# Patient Record
Sex: MALE | Race: WHITE | NOT HISPANIC OR LATINO | Employment: OTHER | URBAN - METROPOLITAN AREA
[De-identification: names, ages, dates, MRNs, and addresses within clinical notes are randomized per-mention and may not be internally consistent; named-entity substitution may affect disease eponyms.]

---

## 2023-05-06 RX ORDER — NAPROXEN 500 MG/1
TABLET ORAL
Qty: 180 TABLET | Refills: 0 | OUTPATIENT
Start: 2023-05-06

## 2023-05-06 RX ORDER — LORATADINE 10 MG/1
TABLET ORAL
Qty: 30 TABLET | Refills: 0 | OUTPATIENT
Start: 2023-05-06

## 2023-05-19 ENCOUNTER — OFFICE VISIT (OUTPATIENT)
Dept: PRIMARY CARE | Facility: CLINIC | Age: 38
End: 2023-05-19
Payer: COMMERCIAL

## 2023-05-19 ENCOUNTER — LAB (OUTPATIENT)
Dept: LAB | Facility: LAB | Age: 38
End: 2023-05-19
Payer: COMMERCIAL

## 2023-05-19 VITALS
HEIGHT: 70 IN | BODY MASS INDEX: 40.8 KG/M2 | DIASTOLIC BLOOD PRESSURE: 76 MMHG | WEIGHT: 285 LBS | SYSTOLIC BLOOD PRESSURE: 126 MMHG

## 2023-05-19 DIAGNOSIS — Z13.220 LIPID SCREENING: ICD-10-CM

## 2023-05-19 DIAGNOSIS — T78.40XA ALLERGY, INITIAL ENCOUNTER: ICD-10-CM

## 2023-05-19 DIAGNOSIS — I10 PRIMARY HYPERTENSION: ICD-10-CM

## 2023-05-19 DIAGNOSIS — Z00.00 HEALTH MAINTENANCE EXAMINATION: ICD-10-CM

## 2023-05-19 DIAGNOSIS — K21.9 GASTROESOPHAGEAL REFLUX DISEASE WITHOUT ESOPHAGITIS: ICD-10-CM

## 2023-05-19 PROBLEM — J45.909 ASTHMA (HHS-HCC): Status: ACTIVE | Noted: 2023-05-19

## 2023-05-19 PROBLEM — H69.91 DYSFUNCTION OF RIGHT EUSTACHIAN TUBE: Status: ACTIVE | Noted: 2023-01-19

## 2023-05-19 PROBLEM — I42.9 CARDIOMYOPATHY (MULTI): Status: ACTIVE | Noted: 2023-05-19

## 2023-05-19 PROBLEM — M54.9 BACK PAIN: Status: ACTIVE | Noted: 2023-05-19

## 2023-05-19 PROBLEM — M26.629 TEMPOROMANDIBULAR JOINT-PAIN-DYSFUNCTION SYNDROME: Status: ACTIVE | Noted: 2020-10-06

## 2023-05-19 PROBLEM — J32.2 CHRONIC ETHMOIDAL SINUSITIS: Status: ACTIVE | Noted: 2020-08-15

## 2023-05-19 PROBLEM — H93.13 BILATERAL TINNITUS: Status: ACTIVE | Noted: 2021-02-09

## 2023-05-19 PROBLEM — H92.01 REFERRED OTALGIA OF RIGHT EAR: Status: ACTIVE | Noted: 2020-10-06

## 2023-05-19 PROBLEM — J32.1 CHRONIC FRONTAL SINUSITIS: Status: ACTIVE | Noted: 2020-08-15

## 2023-05-19 PROBLEM — H66.90 ACUTE OTITIS MEDIA: Status: ACTIVE | Noted: 2023-05-19

## 2023-05-19 PROBLEM — K92.1 BLOODY STOOL: Status: ACTIVE | Noted: 2023-05-19

## 2023-05-19 PROBLEM — R63.5 WEIGHT GAIN: Status: ACTIVE | Noted: 2023-05-19

## 2023-05-19 PROBLEM — J30.2 SEASONAL ALLERGIC RHINITIS: Status: ACTIVE | Noted: 2020-08-15

## 2023-05-19 PROBLEM — J01.90 ACUTE SINUSITIS: Status: ACTIVE | Noted: 2023-05-19

## 2023-05-19 PROBLEM — H93.12 TINNITUS OF LEFT EAR: Status: ACTIVE | Noted: 2021-08-31

## 2023-05-19 LAB
ALANINE AMINOTRANSFERASE (SGPT) (U/L) IN SER/PLAS: 31 U/L (ref 10–52)
ALBUMIN (G/DL) IN SER/PLAS: 4.6 G/DL (ref 3.4–5)
ALKALINE PHOSPHATASE (U/L) IN SER/PLAS: 67 U/L (ref 33–120)
ANION GAP IN SER/PLAS: 14 MMOL/L (ref 10–20)
APPEARANCE, URINE: CLEAR
ASPARTATE AMINOTRANSFERASE (SGOT) (U/L) IN SER/PLAS: 30 U/L (ref 9–39)
BILIRUBIN TOTAL (MG/DL) IN SER/PLAS: 0.5 MG/DL (ref 0–1.2)
BILIRUBIN, URINE: NEGATIVE
BLOOD, URINE: NEGATIVE
CALCIUM (MG/DL) IN SER/PLAS: 9.6 MG/DL (ref 8.6–10.6)
CARBON DIOXIDE, TOTAL (MMOL/L) IN SER/PLAS: 28 MMOL/L (ref 21–32)
CHLORIDE (MMOL/L) IN SER/PLAS: 104 MMOL/L (ref 98–107)
CHOLESTEROL (MG/DL) IN SER/PLAS: 163 MG/DL (ref 0–199)
CHOLESTEROL IN HDL (MG/DL) IN SER/PLAS: 32.5 MG/DL
CHOLESTEROL/HDL RATIO: 5
COLOR, URINE: YELLOW
CREATININE (MG/DL) IN SER/PLAS: 0.89 MG/DL (ref 0.5–1.3)
ERYTHROCYTE DISTRIBUTION WIDTH (RATIO) BY AUTOMATED COUNT: 12.9 % (ref 11.5–14.5)
ERYTHROCYTE MEAN CORPUSCULAR HEMOGLOBIN CONCENTRATION (G/DL) BY AUTOMATED: 32.5 G/DL (ref 32–36)
ERYTHROCYTE MEAN CORPUSCULAR VOLUME (FL) BY AUTOMATED COUNT: 89 FL (ref 80–100)
ERYTHROCYTES (10*6/UL) IN BLOOD BY AUTOMATED COUNT: 5.1 X10E12/L (ref 4.5–5.9)
GFR MALE: >90 ML/MIN/1.73M2
GLUCOSE (MG/DL) IN SER/PLAS: 74 MG/DL (ref 74–99)
GLUCOSE, URINE: NEGATIVE MG/DL
HEMATOCRIT (%) IN BLOOD BY AUTOMATED COUNT: 45.3 % (ref 41–52)
HEMOGLOBIN (G/DL) IN BLOOD: 14.7 G/DL (ref 13.5–17.5)
KETONES, URINE: NEGATIVE MG/DL
LEUKOCYTE ESTERASE, URINE: NEGATIVE
LEUKOCYTES (10*3/UL) IN BLOOD BY AUTOMATED COUNT: 7.4 X10E9/L (ref 4.4–11.3)
NITRITE, URINE: NEGATIVE
NON-HDL CHOLESTEROL: 131 MG/DL
NRBC (PER 100 WBCS) BY AUTOMATED COUNT: 0 /100 WBC (ref 0–0)
PH, URINE: 6 (ref 5–8)
PLATELETS (10*3/UL) IN BLOOD AUTOMATED COUNT: 251 X10E9/L (ref 150–450)
POTASSIUM (MMOL/L) IN SER/PLAS: 4.2 MMOL/L (ref 3.5–5.3)
PROTEIN TOTAL: 7.7 G/DL (ref 6.4–8.2)
PROTEIN, URINE: NEGATIVE MG/DL
SODIUM (MMOL/L) IN SER/PLAS: 142 MMOL/L (ref 136–145)
SPECIFIC GRAVITY, URINE: 1.01 (ref 1–1.03)
UREA NITROGEN (MG/DL) IN SER/PLAS: 14 MG/DL (ref 6–23)
UROBILINOGEN, URINE: <2 MG/DL (ref 0–1.9)

## 2023-05-19 PROCEDURE — 84443 ASSAY THYROID STIM HORMONE: CPT

## 2023-05-19 PROCEDURE — 83718 ASSAY OF LIPOPROTEIN: CPT

## 2023-05-19 PROCEDURE — 81003 URINALYSIS AUTO W/O SCOPE: CPT

## 2023-05-19 PROCEDURE — 3074F SYST BP LT 130 MM HG: CPT | Performed by: INTERNAL MEDICINE

## 2023-05-19 PROCEDURE — 85027 COMPLETE CBC AUTOMATED: CPT

## 2023-05-19 PROCEDURE — 99213 OFFICE O/P EST LOW 20 MIN: CPT | Performed by: INTERNAL MEDICINE

## 2023-05-19 PROCEDURE — 80053 COMPREHEN METABOLIC PANEL: CPT

## 2023-05-19 PROCEDURE — 82465 ASSAY BLD/SERUM CHOLESTEROL: CPT

## 2023-05-19 PROCEDURE — 36415 COLL VENOUS BLD VENIPUNCTURE: CPT

## 2023-05-19 PROCEDURE — 3078F DIAST BP <80 MM HG: CPT | Performed by: INTERNAL MEDICINE

## 2023-05-19 RX ORDER — OMEPRAZOLE 20 MG/1
CAPSULE, DELAYED RELEASE ORAL
Qty: 90 CAPSULE | Refills: 0 | Status: SHIPPED | OUTPATIENT
Start: 2023-05-19 | End: 2023-09-07

## 2023-05-19 RX ORDER — FLUTICASONE PROPIONATE 93 UG/1
SPRAY, METERED NASAL EVERY 12 HOURS
COMMUNITY

## 2023-05-19 RX ORDER — NAPROXEN 500 MG/1
TABLET ORAL
COMMUNITY
Start: 2016-03-30 | End: 2023-05-19 | Stop reason: SDUPTHER

## 2023-05-19 RX ORDER — FLUTICASONE PROPIONATE 50 MCG
SPRAY, SUSPENSION (ML) NASAL
COMMUNITY
Start: 2017-02-14 | End: 2023-05-19

## 2023-05-19 RX ORDER — LISINOPRIL 5 MG/1
5 TABLET ORAL DAILY
COMMUNITY
Start: 2013-10-25 | End: 2023-05-19 | Stop reason: SDUPTHER

## 2023-05-19 RX ORDER — NAPROXEN 500 MG/1
TABLET ORAL
Qty: 90 TABLET | Refills: 0 | Status: SHIPPED | OUTPATIENT
Start: 2023-05-19 | End: 2023-12-11

## 2023-05-19 RX ORDER — LORATADINE 10 MG/1
10 TABLET ORAL DAILY
COMMUNITY
Start: 2020-07-21 | End: 2023-05-19 | Stop reason: SDUPTHER

## 2023-05-19 RX ORDER — LORATADINE 10 MG/1
10 TABLET ORAL DAILY
Qty: 90 TABLET | Refills: 0 | Status: SHIPPED | OUTPATIENT
Start: 2023-05-19 | End: 2023-10-20 | Stop reason: ALTCHOICE

## 2023-05-19 RX ORDER — LISINOPRIL 5 MG/1
5 TABLET ORAL DAILY
Qty: 90 TABLET | Refills: 0 | Status: SHIPPED | OUTPATIENT
Start: 2023-05-19 | End: 2023-09-07

## 2023-05-19 RX ORDER — OMEPRAZOLE 20 MG/1
CAPSULE, DELAYED RELEASE ORAL
COMMUNITY
End: 2023-05-19 | Stop reason: SDUPTHER

## 2023-05-19 NOTE — PROGRESS NOTES
"OFFICE NOTE    NAME OF THE PATIENT: Dex Charles    YOB: 1985    CHIEF COMPLAINT:  Dex Charles today came here for follow-up on various conditions.  Overall, he is a happy person.  Appetite and weight are okay.  He wants a refill on medication.  Arthritis okay.  No problem.    PAST MEDICAL HISTORY:  Reviewed on EMR, unchanged.    CURRENT MEDICATIONS:  Reviewed on EMR, unchanged.    ALLERGIES:  Reviewed on EMR, unchanged.    SOCIAL HISTORY:  Reviewed on EMR, unchanged.    FAMILY HISTORY:  Reviewed on EMR, unchanged.    REVIEW OF SYSTEMS:  All 12 systems reviewed and pertaining covered in history and physical.    PHYSICAL EXAMINATION  VITAL SIGNS:  As recorded and reviewed from EMR.  RESPIRATORY:  The patient had normal inspirations and expirations.  The breath sounds were equal bilaterally and clear to auscultation.  CARDIOVASCULAR:  The patient had S1 normal, split S2 without obvious rubs, clicks, or murmurs.    GASTROINTESTINAL:  There was no hepatosplenomegaly.  There were no palpable masses and no inguinal nodes.  EXTREMITIES:  Legs had no edema.  NEUROLOGIC:  The patient had normal cranial nerves.  The reflexes, sensory, and motor examination were grossly within normal limits.    LAB WORK:  Laboratory testing discussed.    ASSESSMENT AND PLAN:  Hypertension.  Kidney okay.  Cholesterol.  Monitor.  Overweight.  Diet, exercise.  Complete blood work ordered.  I shall see him in a week after tests.  I urged him to come for physical.    Kindly review this note in conjunction with EMR.   Subjective   Patient ID: Dex Charles is a 38 y.o. male who presents for Follow-up.      HPI    Review of Systems    Objective   /76   Ht 1.778 m (5' 10\")   Wt 129 kg (285 lb)   BMI 40.89 kg/m²       Physical Exam    Assessment/Plan   Problem List Items Addressed This Visit          Circulatory    Hypertension    Relevant Medications    lisinopril 5 mg tablet    Other Relevant Orders    CBC " (Completed)    Urinalysis with Reflex Microscopic       Digestive    GERD (gastroesophageal reflux disease)    Relevant Medications    omeprazole (PriLOSEC) 20 mg DR capsule       Other    Allergy    Relevant Medications    naproxen (Naprosyn) 500 mg tablet    loratadine (Claritin) 10 mg tablet     Other Visit Diagnoses       Health maintenance examination        Relevant Orders    CBC (Completed)    Comprehensive metabolic panel    TSH    Urinalysis with Reflex Microscopic    Lipid screening        Relevant Orders    Lipid Panel Non-Fasting

## 2023-05-20 LAB — THYROTROPIN (MIU/L) IN SER/PLAS BY DETECTION LIMIT <= 0.05 MIU/L: 1.03 MIU/L (ref 0.44–3.98)

## 2023-06-27 ENCOUNTER — TELEMEDICINE (OUTPATIENT)
Dept: PRIMARY CARE | Facility: CLINIC | Age: 38
End: 2023-06-27
Payer: COMMERCIAL

## 2023-06-27 VITALS — BODY MASS INDEX: 40.8 KG/M2 | HEIGHT: 70 IN | WEIGHT: 285 LBS

## 2023-06-27 DIAGNOSIS — R09.81 SINUS CONGESTION: ICD-10-CM

## 2023-06-27 PROCEDURE — 99213 OFFICE O/P EST LOW 20 MIN: CPT | Performed by: INTERNAL MEDICINE

## 2023-06-27 RX ORDER — DOXYCYCLINE 100 MG/1
100 CAPSULE ORAL 2 TIMES DAILY
Qty: 20 CAPSULE | Refills: 0 | Status: SHIPPED | OUTPATIENT
Start: 2023-06-27 | End: 2023-07-07

## 2023-06-27 NOTE — PROGRESS NOTES
"Subjective   Patient ID: Dex Charles is a 38 y.o. male who presents for virtual visit (Sinus congestion).    HPI   Patient presents with complaints of having sinus congestion postnasal drainage he does have allergies  Ears feeling plugged up having chest congestion no  He is supposed to start some treatment but they want to treat infection to be treated.  Patient is non-smoker  Review of Systems   All other systems reviewed and are negative.      Objective   Ht 1.778 m (5' 10\")   Wt 129 kg (285 lb)   BMI 40.89 kg/m²     Physical Exam  On virtual visit patient does sound very congested and cough is more sounding congested in the upper airways  Assessment/Plan   Problem List Items Addressed This Visit    None  Visit Diagnoses       Sinus congestion        Relevant Medications    doxycycline (Vibramycin) 100 mg capsule          Treat with doxycycline for 10 days  Steam saline gargles rest fluids  Follow-up if not better     "

## 2023-09-06 DIAGNOSIS — I10 PRIMARY HYPERTENSION: ICD-10-CM

## 2023-09-06 DIAGNOSIS — K21.9 GASTROESOPHAGEAL REFLUX DISEASE WITHOUT ESOPHAGITIS: ICD-10-CM

## 2023-09-07 RX ORDER — LISINOPRIL 5 MG/1
5 TABLET ORAL DAILY
Qty: 90 TABLET | Refills: 0 | Status: SHIPPED | OUTPATIENT
Start: 2023-09-07 | End: 2023-12-09

## 2023-09-07 RX ORDER — OMEPRAZOLE 20 MG/1
CAPSULE, DELAYED RELEASE ORAL
Qty: 90 CAPSULE | Refills: 0 | Status: SHIPPED | OUTPATIENT
Start: 2023-09-07 | End: 2023-12-09

## 2023-09-19 ENCOUNTER — TELEMEDICINE (OUTPATIENT)
Dept: PRIMARY CARE | Facility: CLINIC | Age: 38
End: 2023-09-19
Payer: COMMERCIAL

## 2023-09-19 DIAGNOSIS — J02.9 ACUTE PHARYNGITIS, UNSPECIFIED ETIOLOGY: ICD-10-CM

## 2023-09-19 DIAGNOSIS — I10 HYPERTENSION, UNSPECIFIED TYPE: ICD-10-CM

## 2023-09-19 DIAGNOSIS — J01.90 ACUTE SINUSITIS, RECURRENCE NOT SPECIFIED, UNSPECIFIED LOCATION: Primary | ICD-10-CM

## 2023-09-19 DIAGNOSIS — J20.8 ACUTE BRONCHITIS DUE TO OTHER SPECIFIED ORGANISMS: ICD-10-CM

## 2023-09-19 PROCEDURE — 99213 OFFICE O/P EST LOW 20 MIN: CPT | Performed by: INTERNAL MEDICINE

## 2023-09-19 RX ORDER — DOXYCYCLINE 100 MG/1
100 CAPSULE ORAL 2 TIMES DAILY
Qty: 20 CAPSULE | Refills: 0 | Status: SHIPPED | OUTPATIENT
Start: 2023-09-19 | End: 2023-09-29

## 2023-09-19 RX ORDER — AZELASTINE 1 MG/ML
SPRAY, METERED NASAL
COMMUNITY

## 2023-09-19 RX ORDER — LORATADINE 10 MG/1
10 TABLET ORAL DAILY
Qty: 30 TABLET | Refills: 2 | Status: SHIPPED | OUTPATIENT
Start: 2023-09-19 | End: 2023-10-20 | Stop reason: ALTCHOICE

## 2023-09-19 ASSESSMENT — ENCOUNTER SYMPTOMS
DEPRESSION: 0
OCCASIONAL FEELINGS OF UNSTEADINESS: 0
LOSS OF SENSATION IN FEET: 0

## 2023-09-20 PROBLEM — J20.8 ACUTE BRONCHITIS DUE TO OTHER SPECIFIED ORGANISMS: Status: ACTIVE | Noted: 2023-09-20

## 2023-09-20 PROBLEM — J02.9 ACUTE PHARYNGITIS: Status: ACTIVE | Noted: 2023-09-20

## 2023-09-20 NOTE — PROGRESS NOTES
Subjective   Patient ID: Dex Charles is a 38 y.o. male who presents for Follow-up.    This gentleman today came here for virtual visit.  Cough, yellow sputum, sinus congestion, bronchitis, headache for last several days.  Over-the-counter medications not helping.  He came for follow-up.    I have personally reviewed the patient's Past Medical History, Medications, Allergies, Social History, and Family History in the EMR.    Review of Systems   All other systems reviewed and are negative.    Objective   Virtual.  There were no vitals taken for this visit.    Physical Exam  HENT:      Nose: Congestion present.      Comments: Postnasal drip.     Mouth/Throat:      Comments: Throat congested.    LAB WORK: Laboratory testing discussed.    Assessment/Plan   Problem List Items Addressed This Visit          Cardiac and Vasculature    Hypertension       ENT    Acute sinusitis - Primary    Relevant Medications    doxycycline (Vibramycin) 100 mg capsule    loratadine (Claritin) 10 mg tablet   1. Rhinosinusitis, pharyngitis, bronchitis.  Saline gargles, steam inhalation.  Doxycycline, Claritin, Robitussin, Flonase.  Follow-up in two weeks if not better.  2. Hypertension, okay.    Scribe Attestation  By signing my name below, I, Jordi Chinchilla   attest that this documentation has been prepared under the direction and in the presence of Areli Calvo MD.

## 2023-10-20 ENCOUNTER — OFFICE VISIT (OUTPATIENT)
Dept: PRIMARY CARE | Facility: CLINIC | Age: 38
End: 2023-10-20
Payer: COMMERCIAL

## 2023-10-20 VITALS
DIASTOLIC BLOOD PRESSURE: 60 MMHG | HEIGHT: 70 IN | SYSTOLIC BLOOD PRESSURE: 122 MMHG | BODY MASS INDEX: 40.94 KG/M2 | WEIGHT: 286 LBS

## 2023-10-20 DIAGNOSIS — J01.90 ACUTE SINUSITIS, RECURRENCE NOT SPECIFIED, UNSPECIFIED LOCATION: ICD-10-CM

## 2023-10-20 DIAGNOSIS — I10 PRIMARY HYPERTENSION: Primary | ICD-10-CM

## 2023-10-20 PROCEDURE — 99214 OFFICE O/P EST MOD 30 MIN: CPT | Performed by: INTERNAL MEDICINE

## 2023-10-20 PROCEDURE — 1036F TOBACCO NON-USER: CPT | Performed by: INTERNAL MEDICINE

## 2023-10-20 PROCEDURE — 3074F SYST BP LT 130 MM HG: CPT | Performed by: INTERNAL MEDICINE

## 2023-10-20 PROCEDURE — 3078F DIAST BP <80 MM HG: CPT | Performed by: INTERNAL MEDICINE

## 2023-10-20 RX ORDER — DEXTROMETHORPHAN HYDROBROMIDE, GUAIFENESIN 20; 400 MG/20ML; MG/20ML
5 SOLUTION ORAL 2 TIMES DAILY
Qty: 118 ML | Refills: 0 | Status: SHIPPED | OUTPATIENT
Start: 2023-10-20

## 2023-10-20 RX ORDER — AZITHROMYCIN 250 MG/1
TABLET, FILM COATED ORAL
Qty: 6 TABLET | Refills: 0 | Status: SHIPPED | OUTPATIENT
Start: 2023-10-20 | End: 2023-10-25

## 2023-10-20 RX ORDER — LORATADINE 10 MG/1
10 TABLET ORAL DAILY
Qty: 30 TABLET | Refills: 2 | Status: SHIPPED | OUTPATIENT
Start: 2023-10-20 | End: 2024-02-26 | Stop reason: SDUPTHER

## 2023-10-21 ASSESSMENT — ENCOUNTER SYMPTOMS: SINUS COMPLAINT: 1

## 2023-10-21 NOTE — PROGRESS NOTES
"Subjective   Patient ID: Dex Charles is a 38 y.o. male who presents for Sinus Problem and Follow-up.    Mr. Charles today came here for cough, yellow sputum, sinus congestion, bronchitis, headache, congestion going on for last over several days.  Over-the-counter medications not helping.  He came for follow-up.    I have personally reviewed the patient's Past Medical History, Medications, Allergies, Social History, and Family History in the EMR.    Sinus Problem    Review of Systems   All other systems reviewed and are negative.    Objective   /60   Ht 1.778 m (5' 10\")   Wt 130 kg (286 lb)   BMI 41.04 kg/m²     Physical Exam  Vitals reviewed.   HENT:      Nose: Congestion present.      Comments: Postnasal drip.     Mouth/Throat:      Comments: Throat congested.  Cardiovascular:      Heart sounds: Normal heart sounds, S1 normal and S2 normal. No murmur heard.     No friction rub.   Pulmonary:      Effort: Pulmonary effort is normal.      Breath sounds: Wheezing present.   Abdominal:      Palpations: There is no hepatomegaly, splenomegaly or mass.   Musculoskeletal:      Right lower leg: No edema.      Left lower leg: No edema.   Lymphadenopathy:      Lower Body: No right inguinal adenopathy. No left inguinal adenopathy.   Neurological:      Cranial Nerves: Cranial nerves 2-12 are intact.      Sensory: No sensory deficit.      Motor: Motor function is intact.      Deep Tendon Reflexes: Reflexes are normal and symmetric.     LAB WORK: Laboratory testing discussed.    Assessment/Plan   Problem List Items Addressed This Visit             ICD-10-CM       Cardiac and Vasculature    Hypertension - Primary I10       ENT    Acute sinusitis J01.90    Relevant Medications    azithromycin (Zithromax) 250 mg tablet    loratadine (Claritin) 10 mg tablet    dextromethorphan-guaifenesin (Robitussin Cough-Chest Saran DM) 5-100 mg/5 mL liquid   1. Rhinosinusitis, pharyngitis, bronchitis.  I gave LEEROY-VINITA, Claritin, " Robitussin, Flonase.  2. Hypertension, cardiomyopathy.  He is on lisinopril, doing good.  3. Follow-up in two weeks if he is not better.    Scribe Attestation  By signing my name below, IAutumn Scribe attest that this documentation has been prepared under the direction and in the presence of Areli Calvo MD.

## 2024-01-17 ENCOUNTER — TELEMEDICINE (OUTPATIENT)
Dept: PRIMARY CARE | Facility: CLINIC | Age: 39
End: 2024-01-17
Payer: COMMERCIAL

## 2024-01-17 DIAGNOSIS — J01.90 ACUTE SINUSITIS, RECURRENCE NOT SPECIFIED, UNSPECIFIED LOCATION: ICD-10-CM

## 2024-01-17 PROCEDURE — 99214 OFFICE O/P EST MOD 30 MIN: CPT | Performed by: INTERNAL MEDICINE

## 2024-01-17 RX ORDER — METHYLPREDNISOLONE 4 MG/1
TABLET ORAL
Qty: 21 TABLET | Refills: 0 | Status: SHIPPED | OUTPATIENT
Start: 2024-01-17 | End: 2024-01-24

## 2024-01-17 RX ORDER — LEVOFLOXACIN 500 MG/1
500 TABLET, FILM COATED ORAL DAILY
Qty: 10 TABLET | Refills: 0 | Status: SHIPPED | OUTPATIENT
Start: 2024-01-17 | End: 2024-01-27

## 2024-01-17 RX ORDER — BUDESONIDE AND FORMOTEROL FUMARATE DIHYDRATE 160; 4.5 UG/1; UG/1
2 AEROSOL RESPIRATORY (INHALATION)
Qty: 10.2 G | Refills: 0 | Status: SHIPPED | OUTPATIENT
Start: 2024-01-17

## 2024-01-17 RX ORDER — ALBUTEROL SULFATE 90 UG/1
2 AEROSOL, METERED RESPIRATORY (INHALATION) EVERY 4 HOURS PRN
Qty: 6.7 G | Refills: 1 | Status: SHIPPED | OUTPATIENT
Start: 2024-01-17 | End: 2025-01-16

## 2024-01-17 RX ORDER — GUAIFENESIN 100 MG/5ML
200 SOLUTION ORAL 3 TIMES DAILY PRN
Qty: 120 ML | Refills: 0 | Status: SHIPPED | OUTPATIENT
Start: 2024-01-17 | End: 2024-01-27

## 2024-01-17 RX ORDER — LORATADINE 10 MG/1
10 TABLET ORAL DAILY
Qty: 30 TABLET | Refills: 1 | Status: SHIPPED | OUTPATIENT
Start: 2024-01-17

## 2024-01-18 DIAGNOSIS — U07.1 COVID-19: ICD-10-CM

## 2024-01-18 NOTE — PROGRESS NOTES
Subjective   Patient ID: Dex Charles is a 38 y.o. male who presents for cough and sinus congestion.    Dex Charles today came here for cough, yellow sputum, sinus congestion, bronchitis, headache, congestion going on for last several days.  Over-the-counter medications not helping.  He took antibiotic before.  Symptoms came back.  Wife has similar issue.    I have personally reviewed the patient's Past Medical History, Medications, Allergies, Social History, and Family History in the EMR.    Review of Systems   All other systems reviewed and are negative.    Objective   Virtual.  There were no vitals taken for this visit.    Physical Exam  HENT:      Nose: Congestion present.      Comments: Postnasal drip.     Mouth/Throat:      Comments: Throat congested.    Pulmonary:      Breath sounds: Wheezing present.     LAB WORK: Laboratory testing discussed.    Assessment/Plan   Problem List Items Addressed This Visit             ICD-10-CM       ENT    Acute sinusitis J01.90    Relevant Medications    levoFLOXacin (Levaquin) 500 mg tablet    loratadine (Claritin) 10 mg tablet    guaiFENesin (Robitussin) 100 mg/5 mL syrup    methylPREDNISolone (Medrol Dospak) 4 mg tablets    budesonide-formoteroL (Symbicort) 160-4.5 mcg/actuation inhaler    albuterol (Proventil HFA) 90 mcg/actuation inhaler   Acute asthmatic bronchitis, cough.  Levaquin, Claritin, Robitussin, Flonase, Symbicort, albuterol, Medrol Dosepak.  Follow-up in two weeks if not better.    Scribe Attestation  By signing my name below, IAle Scribe attest that this documentation has been prepared under the direction and in the presence of Areli Calvo MD.

## 2024-02-08 RX ORDER — AZELASTINE 1 MG/ML
SPRAY, METERED NASAL
Qty: 90 ML | Refills: 0 | OUTPATIENT
Start: 2024-02-08

## 2024-02-26 ENCOUNTER — OFFICE VISIT (OUTPATIENT)
Dept: PRIMARY CARE | Facility: CLINIC | Age: 39
End: 2024-02-26
Payer: COMMERCIAL

## 2024-02-26 ENCOUNTER — HOSPITAL ENCOUNTER (OUTPATIENT)
Dept: RADIOLOGY | Facility: CLINIC | Age: 39
Discharge: HOME | End: 2024-02-26
Payer: COMMERCIAL

## 2024-02-26 ENCOUNTER — APPOINTMENT (OUTPATIENT)
Dept: PRIMARY CARE | Facility: CLINIC | Age: 39
End: 2024-02-26
Payer: COMMERCIAL

## 2024-02-26 VITALS
BODY MASS INDEX: 40.94 KG/M2 | WEIGHT: 286 LBS | SYSTOLIC BLOOD PRESSURE: 124 MMHG | HEIGHT: 70 IN | DIASTOLIC BLOOD PRESSURE: 62 MMHG

## 2024-02-26 DIAGNOSIS — J40 BRONCHITIS: ICD-10-CM

## 2024-02-26 DIAGNOSIS — R09.82 POSTNASAL DRIP: Primary | ICD-10-CM

## 2024-02-26 DIAGNOSIS — R07.9 CHEST PAIN, UNSPECIFIED TYPE: ICD-10-CM

## 2024-02-26 DIAGNOSIS — I10 HYPERTENSION, UNSPECIFIED TYPE: ICD-10-CM

## 2024-02-26 DIAGNOSIS — E78.00 HIGH CHOLESTEROL: ICD-10-CM

## 2024-02-26 DIAGNOSIS — R00.2 PALPITATION: ICD-10-CM

## 2024-02-26 DIAGNOSIS — J01.90 ACUTE SINUSITIS, RECURRENCE NOT SPECIFIED, UNSPECIFIED LOCATION: ICD-10-CM

## 2024-02-26 PROCEDURE — 93000 ELECTROCARDIOGRAM COMPLETE: CPT | Performed by: INTERNAL MEDICINE

## 2024-02-26 PROCEDURE — 71046 X-RAY EXAM CHEST 2 VIEWS: CPT

## 2024-02-26 PROCEDURE — 3078F DIAST BP <80 MM HG: CPT | Performed by: INTERNAL MEDICINE

## 2024-02-26 PROCEDURE — 99214 OFFICE O/P EST MOD 30 MIN: CPT | Performed by: INTERNAL MEDICINE

## 2024-02-26 PROCEDURE — 71046 X-RAY EXAM CHEST 2 VIEWS: CPT | Performed by: RADIOLOGY

## 2024-02-26 PROCEDURE — 3074F SYST BP LT 130 MM HG: CPT | Performed by: INTERNAL MEDICINE

## 2024-02-26 PROCEDURE — 1036F TOBACCO NON-USER: CPT | Performed by: INTERNAL MEDICINE

## 2024-02-26 RX ORDER — LORATADINE 10 MG/1
10 TABLET ORAL DAILY
Qty: 30 TABLET | Refills: 2 | Status: SHIPPED | OUTPATIENT
Start: 2024-02-26 | End: 2024-05-26

## 2024-02-26 RX ORDER — AMOXICILLIN AND CLAVULANATE POTASSIUM 875; 125 MG/1; MG/1
875 TABLET, FILM COATED ORAL 2 TIMES DAILY
Qty: 20 TABLET | Refills: 0 | Status: SHIPPED | OUTPATIENT
Start: 2024-02-26 | End: 2024-03-07

## 2024-02-26 RX ORDER — DOXYCYCLINE 100 MG/1
100 CAPSULE ORAL 2 TIMES DAILY
Qty: 20 CAPSULE | Refills: 0 | Status: SHIPPED | OUTPATIENT
Start: 2024-02-26 | End: 2024-03-07

## 2024-02-26 RX ORDER — ALBUTEROL SULFATE AND BUDESONIDE 90; 80 UG/1; UG/1
2 AEROSOL, METERED RESPIRATORY (INHALATION) EVERY 6 HOURS PRN
Qty: 10.7 G | Refills: 0 | Status: SHIPPED | OUTPATIENT
Start: 2024-02-26

## 2024-02-26 ASSESSMENT — ENCOUNTER SYMPTOMS
LOSS OF SENSATION IN FEET: 0
DEPRESSION: 0
OCCASIONAL FEELINGS OF UNSTEADINESS: 0

## 2024-02-26 NOTE — PROGRESS NOTES
"Subjective   Patient ID: Dex Charles is a 38 y.o. male who presents for multiple issues..    This gentleman today came here for multiple issues.  1. Cough, yellow sputum, sinus congestion, bronchitis, not feeling good.  He told me about a month ago he had taken antibiotics and his symptoms are back again.  He was partially better, but never cured.  2. He is also having palpitations, chest pain on and off.  He is not feeling good.  This happened after the infection.    I have personally reviewed the patient's Past Medical History, Medications, Allergies, Social History, and Family History in the EMR.    Sinusitis    Review of Systems   All other systems reviewed and are negative.    Objective   /62   Ht 1.778 m (5' 10\")   Wt 130 kg (286 lb)   BMI 41.04 kg/m²     Physical Exam  Vitals reviewed.   HENT:      Nose: Congestion present.      Comments: Postnasal drip.     Mouth/Throat:      Comments: Throat congested.  Cardiovascular:      Heart sounds: Normal heart sounds, S1 normal and S2 normal. No murmur heard.     No friction rub.   Pulmonary:      Effort: Pulmonary effort is normal.      Breath sounds: Wheezing present.   Abdominal:      Palpations: There is no hepatomegaly, splenomegaly or mass.   Musculoskeletal:      Right lower leg: No edema.      Left lower leg: No edema.   Lymphadenopathy:      Lower Body: No right inguinal adenopathy. No left inguinal adenopathy.   Neurological:      Cranial Nerves: Cranial nerves 2-12 are intact.      Sensory: No sensory deficit.      Motor: Motor function is intact.      Deep Tendon Reflexes: Reflexes are normal and symmetric.     LAB WORK: Laboratory testing.  EKG done.  PVC and chest x-ray done.  Report pending.  Right hilum looks bit congested.    Assessment/Plan   Problem List Items Addressed This Visit             ICD-10-CM       Cardiac and Vasculature    Hypertension I10       ENT    Acute sinusitis J01.90    Relevant Medications    amoxicillin-pot " clavulanate (Augmentin) 875-125 mg tablet    loratadine (Claritin) 10 mg tablet     Other Visit Diagnoses         Codes    Postnasal drip    -  Primary R09.82    Chest pain, unspecified type     R07.9    Relevant Medications    albuterol-budesonide (Airsupra) 90-80 mcg/actuation inhaler    doxycycline (Vibramycin) 100 mg capsule    Other Relevant Orders    XR chest 2 views    ECG 12 lead (Clinic Performed)    Bronchitis     J40    Palpitation     R00.2    High cholesterol     E78.00        1. Acute sinusitis, postnasal drip, and bronchitis.  Augmentin, doxycycline, Claritin, Robitussin, Flonase, inhaler given.  2. Chest pain, palpitation.  He is okay.  3. Hypertension, okay.  4. High cholesterol.  Monitor.  4. Followup appointment in two weeks if he is not better.  I also encouraged him to come for physical, see before the x-ray.    Scribe Attestation  By signing my name below, I, Jordi Chinchilla attest that this documentation has been prepared under the direction and in the presence of Areli Calvo MD.

## 2024-03-05 DIAGNOSIS — K21.9 GASTROESOPHAGEAL REFLUX DISEASE WITHOUT ESOPHAGITIS: ICD-10-CM

## 2024-03-05 DIAGNOSIS — I10 PRIMARY HYPERTENSION: ICD-10-CM

## 2024-03-05 RX ORDER — LISINOPRIL 5 MG/1
5 TABLET ORAL DAILY
Qty: 90 TABLET | Refills: 0 | Status: SHIPPED | OUTPATIENT
Start: 2024-03-05

## 2024-03-05 RX ORDER — OMEPRAZOLE 20 MG/1
CAPSULE, DELAYED RELEASE ORAL
Qty: 90 CAPSULE | Refills: 0 | Status: SHIPPED | OUTPATIENT
Start: 2024-03-05

## 2024-07-03 ENCOUNTER — APPOINTMENT (OUTPATIENT)
Dept: PRIMARY CARE | Facility: CLINIC | Age: 39
End: 2024-07-03
Payer: COMMERCIAL

## 2024-07-03 ENCOUNTER — TELEMEDICINE (OUTPATIENT)
Dept: PRIMARY CARE | Facility: CLINIC | Age: 39
End: 2024-07-03
Payer: COMMERCIAL

## 2024-07-03 DIAGNOSIS — J02.9 PHARYNGITIS, UNSPECIFIED ETIOLOGY: ICD-10-CM

## 2024-07-03 DIAGNOSIS — J40 BRONCHITIS: ICD-10-CM

## 2024-07-03 DIAGNOSIS — J01.90 ACUTE SINUSITIS, RECURRENCE NOT SPECIFIED, UNSPECIFIED LOCATION: ICD-10-CM

## 2024-07-03 DIAGNOSIS — M19.90 ARTHRITIS: ICD-10-CM

## 2024-07-03 DIAGNOSIS — J32.9 RHINOSINUSITIS: Primary | ICD-10-CM

## 2024-07-03 PROCEDURE — 99213 OFFICE O/P EST LOW 20 MIN: CPT | Performed by: INTERNAL MEDICINE

## 2024-07-03 RX ORDER — AMOXICILLIN AND CLAVULANATE POTASSIUM 875; 125 MG/1; MG/1
875 TABLET, FILM COATED ORAL 2 TIMES DAILY
Qty: 20 TABLET | Refills: 0 | Status: SHIPPED | OUTPATIENT
Start: 2024-07-03 | End: 2024-07-13

## 2024-07-03 RX ORDER — METHOCARBAMOL 500 MG/1
500 TABLET, FILM COATED ORAL 4 TIMES DAILY PRN
Qty: 40 TABLET | Refills: 0 | Status: SHIPPED | OUTPATIENT
Start: 2024-07-03 | End: 2024-09-01

## 2024-07-03 RX ORDER — LORATADINE 10 MG/1
10 TABLET ORAL DAILY
Qty: 30 TABLET | Refills: 0 | Status: SHIPPED | OUTPATIENT
Start: 2024-07-03

## 2024-07-03 RX ORDER — FLUTICASONE FUROATE 27.5 UG/1
1 SPRAY, METERED NASAL
Qty: 10 G | Refills: 0 | Status: SHIPPED | OUTPATIENT
Start: 2024-07-03 | End: 2025-07-03

## 2024-07-03 ASSESSMENT — LIFESTYLE VARIABLES: HISTORY_OF_SMOKING: I HAVE NEVER SMOKED

## 2024-07-04 NOTE — PROGRESS NOTES
Subjective   Patient ID: Dex Charles is a 39 y.o. male who presents for sinus congestion, postnasal drip, congestion.    1. Dex Charles today came here for sinus congestion, postnasal drip, congestion going on for last several days.  Over-the-counter medications not helping.  2. Follow-up on other conditions.    I have personally reviewed the patient's Past Medical History, Medications, Allergies, Social History, and Family History in the EMR.    Review of Systems   All other systems reviewed and are negative.    Objective   There were no vitals taken for this visit.    Physical Exam  Vitals reviewed.   HENT:      Nose: Congestion present.      Comments: Postnasal drip.     Mouth/Throat:      Comments: Throat congested.  Cardiovascular:      Heart sounds: Normal heart sounds, S1 normal and S2 normal. No murmur heard.     No friction rub.   Pulmonary:      Effort: Pulmonary effort is normal.      Breath sounds: Wheezing present.   Abdominal:      Palpations: There is no hepatomegaly, splenomegaly or mass.   Musculoskeletal:      Right lower leg: No edema.      Left lower leg: No edema.   Lymphadenopathy:      Lower Body: No right inguinal adenopathy. No left inguinal adenopathy.   Neurological:      Cranial Nerves: Cranial nerves 2-12 are intact.      Sensory: No sensory deficit.      Motor: Motor function is intact.      Deep Tendon Reflexes: Reflexes are normal and symmetric.     LAB WORK: Laboratory testing discussed.    Assessment/Plan   Problem List Items Addressed This Visit             ICD-10-CM       ENT    Acute sinusitis J01.90    Relevant Medications    amoxicillin-pot clavulanate (Augmentin) 875-125 mg tablet    methocarbamol (Robaxin) 500 mg tablet    fluticasone (Flonase Sensimist) 27.5 mcg/actuation nasal spray    loratadine (Claritin) 10 mg tablet     Other Visit Diagnoses         Codes    Rhinosinusitis    -  Primary J32.9    Pharyngitis, unspecified etiology     J02.9    Bronchitis     J40     Arthritis     M19.90        1. Rhinosinusitis, pharyngitis, bronchitis.  Augmentin, Claritin, Robitussin, Flonase.  2. Arthritis.  Naprosyn refill given.  3. Follow-up in two weeks if not better.  4. Time spent 24 minutes.  5. Continue to follow.    Scribe Attestation  By signing my name below, Ale WALLS Scribe attest that this documentation has been prepared under the direction and in the presence of Areli Calvo MD.

## 2024-08-02 ENCOUNTER — LAB (OUTPATIENT)
Dept: LAB | Facility: LAB | Age: 39
End: 2024-08-02
Payer: COMMERCIAL

## 2024-08-02 ENCOUNTER — OFFICE VISIT (OUTPATIENT)
Dept: PRIMARY CARE | Facility: CLINIC | Age: 39
End: 2024-08-02
Payer: COMMERCIAL

## 2024-08-02 VITALS
HEIGHT: 70 IN | WEIGHT: 279 LBS | SYSTOLIC BLOOD PRESSURE: 128 MMHG | BODY MASS INDEX: 39.94 KG/M2 | DIASTOLIC BLOOD PRESSURE: 80 MMHG

## 2024-08-02 DIAGNOSIS — H53.8 OTHER VISUAL DISTURBANCES: ICD-10-CM

## 2024-08-02 DIAGNOSIS — Z12.5 ENCOUNTER FOR PROSTATE CANCER SCREENING: ICD-10-CM

## 2024-08-02 DIAGNOSIS — J32.9 RHINOSINUSITIS: Primary | ICD-10-CM

## 2024-08-02 DIAGNOSIS — I10 HYPERTENSION, UNSPECIFIED TYPE: ICD-10-CM

## 2024-08-02 DIAGNOSIS — R53.83 FATIGUE, UNSPECIFIED TYPE: ICD-10-CM

## 2024-08-02 DIAGNOSIS — J40 BRONCHITIS: ICD-10-CM

## 2024-08-02 DIAGNOSIS — E78.00 HIGH CHOLESTEROL: ICD-10-CM

## 2024-08-02 DIAGNOSIS — J01.90 ACUTE SINUSITIS, RECURRENCE NOT SPECIFIED, UNSPECIFIED LOCATION: ICD-10-CM

## 2024-08-02 DIAGNOSIS — J02.9 PHARYNGITIS, UNSPECIFIED ETIOLOGY: ICD-10-CM

## 2024-08-02 DIAGNOSIS — R73.03 PRE-DIABETES: ICD-10-CM

## 2024-08-02 DIAGNOSIS — E34.9 TESTOSTERONE DEFICIENCY: ICD-10-CM

## 2024-08-02 LAB
ALBUMIN SERPL BCP-MCNC: 4.7 G/DL (ref 3.4–5)
ALP SERPL-CCNC: 57 U/L (ref 33–120)
ALT SERPL W P-5'-P-CCNC: 19 U/L (ref 10–52)
ANION GAP SERPL CALC-SCNC: 13 MMOL/L (ref 10–20)
APPEARANCE UR: CLEAR
AST SERPL W P-5'-P-CCNC: 21 U/L (ref 9–39)
BILIRUB SERPL-MCNC: 0.7 MG/DL (ref 0–1.2)
BILIRUB UR STRIP.AUTO-MCNC: NEGATIVE MG/DL
BUN SERPL-MCNC: 17 MG/DL (ref 6–23)
CALCIUM SERPL-MCNC: 9.7 MG/DL (ref 8.6–10.6)
CHLORIDE SERPL-SCNC: 101 MMOL/L (ref 98–107)
CHOLEST SERPL-MCNC: 155 MG/DL (ref 0–199)
CHOLESTEROL/HDL RATIO: 6.4
CO2 SERPL-SCNC: 30 MMOL/L (ref 21–32)
COLOR UR: NORMAL
CREAT SERPL-MCNC: 0.76 MG/DL (ref 0.5–1.3)
EGFRCR SERPLBLD CKD-EPI 2021: >90 ML/MIN/1.73M*2
ERYTHROCYTE [DISTWIDTH] IN BLOOD BY AUTOMATED COUNT: 12.9 % (ref 11.5–14.5)
EST. AVERAGE GLUCOSE BLD GHB EST-MCNC: 103 MG/DL
GLUCOSE SERPL-MCNC: 90 MG/DL (ref 74–99)
GLUCOSE UR STRIP.AUTO-MCNC: NORMAL MG/DL
HBA1C MFR BLD: 5.2 %
HCT VFR BLD AUTO: 44.4 % (ref 41–52)
HDLC SERPL-MCNC: 24.3 MG/DL
HGB BLD-MCNC: 14.3 G/DL (ref 13.5–17.5)
KETONES UR STRIP.AUTO-MCNC: NEGATIVE MG/DL
LDLC SERPL CALC-MCNC: 102 MG/DL
LEUKOCYTE ESTERASE UR QL STRIP.AUTO: NEGATIVE
MCH RBC QN AUTO: 29 PG (ref 26–34)
MCHC RBC AUTO-ENTMCNC: 32.2 G/DL (ref 32–36)
MCV RBC AUTO: 90 FL (ref 80–100)
NITRITE UR QL STRIP.AUTO: NEGATIVE
NON HDL CHOLESTEROL: 131 MG/DL (ref 0–149)
NRBC BLD-RTO: 0 /100 WBCS (ref 0–0)
PH UR STRIP.AUTO: 6 [PH]
PLATELET # BLD AUTO: 252 X10*3/UL (ref 150–450)
POTASSIUM SERPL-SCNC: 4.1 MMOL/L (ref 3.5–5.3)
PROT SERPL-MCNC: 7.1 G/DL (ref 6.4–8.2)
PROT UR STRIP.AUTO-MCNC: NEGATIVE MG/DL
PSA SERPL-MCNC: 0.22 NG/ML
RBC # BLD AUTO: 4.93 X10*6/UL (ref 4.5–5.9)
RBC # UR STRIP.AUTO: NEGATIVE /UL
SODIUM SERPL-SCNC: 140 MMOL/L (ref 136–145)
SP GR UR STRIP.AUTO: 1.01
TRIGL SERPL-MCNC: 146 MG/DL (ref 0–149)
TSH SERPL-ACNC: 0.96 MIU/L (ref 0.44–3.98)
UROBILINOGEN UR STRIP.AUTO-MCNC: NORMAL MG/DL
VLDL: 29 MG/DL (ref 0–40)
WBC # BLD AUTO: 6.2 X10*3/UL (ref 4.4–11.3)

## 2024-08-02 PROCEDURE — 80053 COMPREHEN METABOLIC PANEL: CPT

## 2024-08-02 PROCEDURE — 80061 LIPID PANEL: CPT

## 2024-08-02 PROCEDURE — 84153 ASSAY OF PSA TOTAL: CPT

## 2024-08-02 PROCEDURE — 84402 ASSAY OF FREE TESTOSTERONE: CPT

## 2024-08-02 PROCEDURE — 3074F SYST BP LT 130 MM HG: CPT | Performed by: INTERNAL MEDICINE

## 2024-08-02 PROCEDURE — 36415 COLL VENOUS BLD VENIPUNCTURE: CPT

## 2024-08-02 PROCEDURE — 85027 COMPLETE CBC AUTOMATED: CPT

## 2024-08-02 PROCEDURE — 83036 HEMOGLOBIN GLYCOSYLATED A1C: CPT

## 2024-08-02 PROCEDURE — 81003 URINALYSIS AUTO W/O SCOPE: CPT

## 2024-08-02 PROCEDURE — 99214 OFFICE O/P EST MOD 30 MIN: CPT | Performed by: INTERNAL MEDICINE

## 2024-08-02 PROCEDURE — 3079F DIAST BP 80-89 MM HG: CPT | Performed by: INTERNAL MEDICINE

## 2024-08-02 PROCEDURE — 84443 ASSAY THYROID STIM HORMONE: CPT

## 2024-08-02 PROCEDURE — 3008F BODY MASS INDEX DOCD: CPT | Performed by: INTERNAL MEDICINE

## 2024-08-02 RX ORDER — AMOXICILLIN AND CLAVULANATE POTASSIUM 875; 125 MG/1; MG/1
875 TABLET, FILM COATED ORAL 2 TIMES DAILY
Qty: 20 TABLET | Refills: 0 | Status: SHIPPED | OUTPATIENT
Start: 2024-08-02 | End: 2024-08-12

## 2024-08-02 RX ORDER — LORATADINE 10 MG/1
10 TABLET ORAL DAILY
Qty: 30 TABLET | Refills: 2 | Status: SHIPPED | OUTPATIENT
Start: 2024-08-02 | End: 2024-10-31

## 2024-08-02 ASSESSMENT — ENCOUNTER SYMPTOMS
OCCASIONAL FEELINGS OF UNSTEADINESS: 0
DEPRESSION: 0
LOSS OF SENSATION IN FEET: 0

## 2024-08-03 NOTE — PROGRESS NOTES
"Subjective   Patient ID: Dex Charles is a 39 y.o. male who presents for sinus congestion, postnasal congestion.    1.  Mr. Charles today came here for sinus congestion, postnasal congestion.  In a month ago he took Augmentin, it did not help completely.  2. He is feeling very tired, fatigued, ______.  3. He has some vision issue at times.  He has blurred vision, lasts a few minutes. He is concerned with that.  He saw his optometrist one-and-a-half year ago.  He was okay.    I have personally reviewed the patient's Past Medical History, Medications, Allergies, Social History, and Family History in the EMR.    Review of Systems   All other systems reviewed and are negative.    Objective   /80   Ht 1.778 m (5' 10\")   Wt 127 kg (279 lb)   BMI 40.03 kg/m²     Physical Exam  Vitals reviewed.   HENT:      Nose: Congestion present.      Comments: Postnasal drip.     Mouth/Throat:      Comments: Throat congested.  Cardiovascular:      Heart sounds: Normal heart sounds, S1 normal and S2 normal. No murmur heard.     No friction rub.   Pulmonary:      Effort: Pulmonary effort is normal.      Breath sounds: Wheezing (Bilateral) present.   Abdominal:      Palpations: There is no hepatomegaly, splenomegaly or mass.   Musculoskeletal:      Right lower leg: No edema.      Left lower leg: No edema.   Lymphadenopathy:      Lower Body: No right inguinal adenopathy. No left inguinal adenopathy.   Neurological:      Cranial Nerves: Cranial nerves 2-12 are intact.      Sensory: No sensory deficit.      Motor: Motor function is intact.      Deep Tendon Reflexes: Reflexes are normal and symmetric.     LAB WORK:  Laboratory testing discussed.    Assessment/Plan   Problem List Items Addressed This Visit             ICD-10-CM       Cardiac and Vasculature    Hypertension I10    Relevant Orders    CBC (Completed)    Thyroid Stimulating Hormone (Completed)    Urinalysis with Reflex Microscopic (Completed)       ENT    Acute " sinusitis J01.90    Relevant Medications    amoxicillin-pot clavulanate (Augmentin) 875-125 mg tablet    loratadine (Claritin) 10 mg tablet     Other Visit Diagnoses         Codes    Rhinosinusitis    -  Primary J32.9    High cholesterol     E78.00    Relevant Orders    Comprehensive Metabolic Panel (Completed)    Lipid Panel (Completed)    Encounter for prostate cancer screening     Z12.5    Relevant Orders    Prostate Specific Antigen, Screen (Completed)    Pre-diabetes     R73.03    Relevant Orders    Hemoglobin A1C (Completed)    Testosterone deficiency     E34.9    Relevant Orders    Testosterone,Free and Total    Pharyngitis, unspecified etiology     J02.9    Bronchitis     J40    Fatigue, unspecified type     R53.83    Other visual disturbances     H53.8        1. Rhinosinusitis, pharyngitis and bronchitis.  Levaquin, Claritin, Robitussin, and Flonase.  2. Hypertension, stable.  3. Sluggish vision and problem.  He is going to see ophthalmologist.  4. Tired, fatigued, and exhausted.  Blood work ordered.  5. Low testosterone.  Ordered.  6. Cholesterol.  Blood work ordered.  7. I urged him to see me in a couple of weeks after testing.  Meanwhile, he will see the eye doctor himself.  If necessary, I will send him to eye doctor.    Scribe Attestation  By signing my name below, I, Jordi Chinchilla attest that this documentation has been prepared under the direction and in the presence of Areli Calvo MD.

## 2024-08-08 LAB
TESTOSTERONE FREE (CHAN): 53 PG/ML (ref 35–155)
TESTOSTERONE,TOTAL,LC-MS/MS: 312 NG/DL (ref 250–1100)

## 2024-09-03 RX ORDER — AZELASTINE 1 MG/ML
SPRAY, METERED NASAL
Qty: 90 ML | Refills: 0 | OUTPATIENT
Start: 2024-09-03

## 2024-09-04 DIAGNOSIS — J01.90 ACUTE SINUSITIS, RECURRENCE NOT SPECIFIED, UNSPECIFIED LOCATION: ICD-10-CM

## 2024-09-04 RX ORDER — AZELASTINE 1 MG/ML
2 SPRAY, METERED NASAL 2 TIMES DAILY
Qty: 30 ML | Refills: 3 | Status: SHIPPED | OUTPATIENT
Start: 2024-09-04

## 2024-09-24 ENCOUNTER — APPOINTMENT (OUTPATIENT)
Dept: PRIMARY CARE | Facility: CLINIC | Age: 39
End: 2024-09-24
Payer: COMMERCIAL

## 2024-09-24 VITALS
WEIGHT: 282 LBS | SYSTOLIC BLOOD PRESSURE: 134 MMHG | DIASTOLIC BLOOD PRESSURE: 78 MMHG | HEIGHT: 70 IN | BODY MASS INDEX: 40.37 KG/M2

## 2024-09-24 DIAGNOSIS — J32.9 RHINOSINUSITIS: Primary | ICD-10-CM

## 2024-09-24 DIAGNOSIS — J01.90 ACUTE SINUSITIS, RECURRENCE NOT SPECIFIED, UNSPECIFIED LOCATION: ICD-10-CM

## 2024-09-24 DIAGNOSIS — I10 HYPERTENSION, UNSPECIFIED TYPE: ICD-10-CM

## 2024-09-24 DIAGNOSIS — K21.9 GASTROESOPHAGEAL REFLUX DISEASE WITHOUT ESOPHAGITIS: ICD-10-CM

## 2024-09-24 DIAGNOSIS — T78.40XA ALLERGY, INITIAL ENCOUNTER: ICD-10-CM

## 2024-09-24 DIAGNOSIS — E78.00 HIGH CHOLESTEROL: ICD-10-CM

## 2024-09-24 PROCEDURE — 3075F SYST BP GE 130 - 139MM HG: CPT | Performed by: INTERNAL MEDICINE

## 2024-09-24 PROCEDURE — 3008F BODY MASS INDEX DOCD: CPT | Performed by: INTERNAL MEDICINE

## 2024-09-24 PROCEDURE — 99214 OFFICE O/P EST MOD 30 MIN: CPT | Performed by: INTERNAL MEDICINE

## 2024-09-24 PROCEDURE — 3078F DIAST BP <80 MM HG: CPT | Performed by: INTERNAL MEDICINE

## 2024-09-24 RX ORDER — GUAIFENESIN 100 MG/5ML
200 SOLUTION ORAL 3 TIMES DAILY PRN
Qty: 120 ML | Refills: 0 | Status: SHIPPED | OUTPATIENT
Start: 2024-09-24 | End: 2024-10-04

## 2024-09-24 RX ORDER — AMOXICILLIN AND CLAVULANATE POTASSIUM 875; 125 MG/1; MG/1
875 TABLET, FILM COATED ORAL 2 TIMES DAILY
Qty: 20 TABLET | Refills: 0 | Status: SHIPPED | OUTPATIENT
Start: 2024-09-24 | End: 2024-10-04

## 2024-09-24 RX ORDER — LORATADINE 10 MG/1
10 TABLET ORAL DAILY
Qty: 30 TABLET | Refills: 0 | Status: SHIPPED | OUTPATIENT
Start: 2024-09-24

## 2024-09-25 NOTE — PROGRESS NOTES
"Subjective   Patient ID: Dex Charles is a 39 y.o. male who presents for Follow-up.    This gentleman today came here for cough, yellow sputum, sinus congestion, bronchitis, not feeling well, postnasal drip, ______.  Feeling weak, tired, fatigued, and exhausted.  Follow-up on blood work.    I have personally reviewed the patient's Past Medical History, Medications, Allergies, Social History, and Family History in the EMR.    Review of Systems   All other systems reviewed and are negative.    Objective   /78   Ht 1.778 m (5' 10\")   Wt 128 kg (282 lb)   BMI 40.46 kg/m²     Physical Exam  Vitals reviewed.   HENT:      Nose: Congestion present.      Comments: Postnasal drip.     Mouth/Throat:      Comments: Throat congested.  Cardiovascular:      Heart sounds: Normal heart sounds, S1 normal and S2 normal. No murmur heard.     No friction rub.   Pulmonary:      Effort: Pulmonary effort is normal.      Breath sounds: Wheezing (bilateral) present.   Abdominal:      Palpations: There is no hepatomegaly, splenomegaly or mass.   Musculoskeletal:      Right lower leg: No edema.      Left lower leg: No edema.   Lymphadenopathy:      Lower Body: No right inguinal adenopathy. No left inguinal adenopathy.   Neurological:      Cranial Nerves: Cranial nerves 2-12 are intact.      Sensory: No sensory deficit.      Motor: Motor function is intact.      Deep Tendon Reflexes: Reflexes are normal and symmetric.     LAB WORK:  Laboratory testing discussed.    Assessment/Plan   Problem List Items Addressed This Visit             ICD-10-CM       Allergies and Adverse Reactions    Allergy T78.40XA    Relevant Orders    Referral to Allergy       Cardiac and Vasculature    Hypertension I10    Relevant Orders    CBC    Urinalysis with Reflex Microscopic    Thyroid Stimulating Hormone       ENT    Acute sinusitis J01.90    Relevant Medications    amoxicillin-pot clavulanate (Augmentin) 875-125 mg tablet    guaiFENesin (Robitussin) " 100 mg/5 mL syrup    loratadine (Claritin) 10 mg tablet       Gastrointestinal and Abdominal    GERD (gastroesophageal reflux disease) K21.9     Other Visit Diagnoses         Codes    Rhinosinusitis    -  Primary J32.9    High cholesterol     E78.00    Relevant Orders    Comprehensive Metabolic Panel    Lipid Panel        1. Rhinosinusitis, pharyngitis, and bronchitis.  Augmentin, Claritin, Robitussin, and Flonase.  2. GERD, on PPI.  3. Hypertension, okay.  4. High cholesterol, stable.  5. Question of allergies and sinus.  He wants to have allergist within the system.  Current one is costing him a lot of money.  He wants to avoid unnecessary out of network payments.  6. Follow-up appointment with me in six to eight weeks’ time.    Scribe Attestation  By signing my name below, IAutumn, Angellaibe attest that this documentation has been prepared under the direction and in the presence of Areli Calvo MD.

## 2025-02-25 ENCOUNTER — TELEMEDICINE (OUTPATIENT)
Dept: PRIMARY CARE | Facility: CLINIC | Age: 40
End: 2025-02-25
Payer: COMMERCIAL

## 2025-02-25 DIAGNOSIS — J01.90 ACUTE SINUSITIS, RECURRENCE NOT SPECIFIED, UNSPECIFIED LOCATION: ICD-10-CM

## 2025-02-25 DIAGNOSIS — J40 BRONCHITIS: ICD-10-CM

## 2025-02-25 DIAGNOSIS — J02.9 PHARYNGITIS, UNSPECIFIED ETIOLOGY: ICD-10-CM

## 2025-02-25 DIAGNOSIS — J32.9 RHINOSINUSITIS: Primary | ICD-10-CM

## 2025-02-25 PROCEDURE — 99213 OFFICE O/P EST LOW 20 MIN: CPT | Performed by: INTERNAL MEDICINE

## 2025-02-25 RX ORDER — GUAIFENESIN 100 MG/5ML
200 SOLUTION ORAL 3 TIMES DAILY PRN
Qty: 120 ML | Refills: 0 | Status: SHIPPED | OUTPATIENT
Start: 2025-02-25 | End: 2025-03-07

## 2025-02-25 RX ORDER — LORATADINE 10 MG/1
10 TABLET ORAL DAILY
Qty: 30 TABLET | Refills: 0 | Status: SHIPPED | OUTPATIENT
Start: 2025-02-25 | End: 2025-05-26

## 2025-02-25 RX ORDER — AMOXICILLIN AND CLAVULANATE POTASSIUM 875; 125 MG/1; MG/1
875 TABLET, FILM COATED ORAL 2 TIMES DAILY
Qty: 20 TABLET | Refills: 0 | Status: SHIPPED | OUTPATIENT
Start: 2025-02-25 | End: 2025-03-07

## 2025-02-26 NOTE — PROGRESS NOTES
Subjective   Patient ID: Dex Charles is a 39 y.o. male who presents for Illness.    Jesse today came here for a virtual visit.  He came here for cough, yellow sputum, sinus congestion, bronchitis, headache going on for the last several days.  Over-the-counter medications not helping.  He came here for follow-up on various conditions.    I have personally reviewed the patient's Past Medical History, Medications, Allergies, Social History, and Family History in the EMR.    Review of Systems   All other systems reviewed and are negative.    Objective   Virtual exam. There were no vitals taken for this visit.    Physical Exam  HENT:      Nose: Congestion present.      Comments: Postnasal drip.     Mouth/Throat:      Comments: Throat congested.  Pulmonary:      Breath sounds: Wheezing present.   Musculoskeletal:      Right lower leg: No edema.      Left lower leg: No edema.     LAB WORK:  Laboratory testing discussed.    Assessment/Plan   Problem List Items Addressed This Visit             ICD-10-CM    Acute sinusitis J01.90    Relevant Medications    amoxicillin-pot clavulanate (Augmentin) 875-125 mg tablet    loratadine (Claritin) 10 mg tablet    guaiFENesin (Robitussin) 100 mg/5 mL syrup     Other Visit Diagnoses         Codes    Rhinosinusitis    -  Primary J32.9    Pharyngitis, unspecified etiology     J02.9    Bronchitis     J40        1. Rhinosinusitis, pharyngitis and bronchitis.  Saline gargles and steam inhalation.  Augmentin, Claritin, Robitussin, Flonase, Tessalon Perles, and albuterol.  2. Follow up in two weeks if not better.    Scribe Attestation  By signing my name below, IAle Scribe attest that this documentation has been prepared under the direction and in the presence of Areli Calvo MD.     All medical record entries made by the scribe were personally dictated by me I have reviewed the chart and agree the record accurately reflects my personal performance of his history physical  examination and management

## 2025-03-20 ENCOUNTER — HOSPITAL ENCOUNTER (OUTPATIENT)
Dept: RADIOLOGY | Facility: CLINIC | Age: 40
Discharge: HOME | End: 2025-03-20
Payer: COMMERCIAL

## 2025-03-20 ENCOUNTER — OFFICE VISIT (OUTPATIENT)
Dept: PRIMARY CARE | Facility: CLINIC | Age: 40
End: 2025-03-20
Payer: COMMERCIAL

## 2025-03-20 VITALS
DIASTOLIC BLOOD PRESSURE: 84 MMHG | WEIGHT: 278 LBS | SYSTOLIC BLOOD PRESSURE: 136 MMHG | BODY MASS INDEX: 39.8 KG/M2 | HEIGHT: 70 IN

## 2025-03-20 DIAGNOSIS — I10 HYPERTENSION, UNSPECIFIED TYPE: ICD-10-CM

## 2025-03-20 DIAGNOSIS — J40 BRONCHITIS: ICD-10-CM

## 2025-03-20 DIAGNOSIS — J40 BRONCHITIS: Primary | ICD-10-CM

## 2025-03-20 DIAGNOSIS — T78.40XD ALLERGY, SUBSEQUENT ENCOUNTER: ICD-10-CM

## 2025-03-20 PROCEDURE — 3079F DIAST BP 80-89 MM HG: CPT | Performed by: INTERNAL MEDICINE

## 2025-03-20 PROCEDURE — 71046 X-RAY EXAM CHEST 2 VIEWS: CPT

## 2025-03-20 PROCEDURE — 3075F SYST BP GE 130 - 139MM HG: CPT | Performed by: INTERNAL MEDICINE

## 2025-03-20 PROCEDURE — 3008F BODY MASS INDEX DOCD: CPT | Performed by: INTERNAL MEDICINE

## 2025-03-20 PROCEDURE — 99213 OFFICE O/P EST LOW 20 MIN: CPT | Performed by: INTERNAL MEDICINE

## 2025-03-20 RX ORDER — BENZONATATE 100 MG/1
100 CAPSULE ORAL 3 TIMES DAILY PRN
Qty: 60 CAPSULE | Refills: 0 | Status: SHIPPED | OUTPATIENT
Start: 2025-03-20 | End: 2025-04-19

## 2025-03-20 RX ORDER — ALBUTEROL SULFATE 90 UG/1
2 INHALANT RESPIRATORY (INHALATION) EVERY 4 HOURS PRN
Qty: 8 G | Refills: 5 | Status: SHIPPED | OUTPATIENT
Start: 2025-03-20 | End: 2026-03-20

## 2025-03-20 RX ORDER — LORATADINE 10 MG/1
10 TABLET ORAL DAILY
Qty: 30 TABLET | Refills: 1 | Status: SHIPPED | OUTPATIENT
Start: 2025-03-20 | End: 2025-06-18

## 2025-03-20 RX ORDER — LEVOFLOXACIN 500 MG/1
500 TABLET, FILM COATED ORAL DAILY
Qty: 10 TABLET | Refills: 0 | Status: SHIPPED | OUTPATIENT
Start: 2025-03-20 | End: 2025-03-30

## 2025-03-20 ASSESSMENT — ENCOUNTER SYMPTOMS
HOARSE VOICE: 0
NECK PAIN: 0
HEADACHES: 0
COUGH: 1
SWOLLEN GLANDS: 1
VOMITING: 0
SORE THROAT: 1
ABDOMINAL PAIN: 0
SHORTNESS OF BREATH: 0
DIARRHEA: 0
STRIDOR: 0
TROUBLE SWALLOWING: 0

## 2025-03-21 ASSESSMENT — ENCOUNTER SYMPTOMS
NECK PAIN: 0
VOMITING: 0
ABDOMINAL PAIN: 0
SORE THROAT: 1
COUGH: 1
HEADACHES: 0
HOARSE VOICE: 0
TROUBLE SWALLOWING: 0
STRIDOR: 0
SWOLLEN GLANDS: 1
SHORTNESS OF BREATH: 0
DIARRHEA: 0

## 2025-03-21 NOTE — PROGRESS NOTES
"Subjective   Patient ID: Dex Charles is a 39 y.o. male who presents for Follow-up (Various conditions).    Mr. Charles is very unhappy.  He took Augmentin, symptoms are not better.  He has throat congestion, shortness of breath, brownish sputum coming up.  No blood.  Feeling weak, tired, fatigued, exhausted.  He came for follow-up.    I have personally reviewed the patient's Past Medical History, Medications, Allergies, Social History, and Family History in the EMR.    Sore Throat   This is a new problem. The current episode started in the past 7 days. The problem has been waxing and waning. Neither side of throat is experiencing more pain than the other. There has been no fever. The fever has been present for 5 days or more. The pain is at a severity of 2/10. The pain is mild. Associated symptoms include congestion, coughing, ear discharge, a plugged ear sensation and swollen glands. Pertinent negatives include no abdominal pain, diarrhea, drooling, ear pain, headaches, hoarse voice, neck pain, shortness of breath, stridor, trouble swallowing or vomiting.     Review of Systems   HENT:  Positive for congestion, ear discharge and sore throat. Negative for drooling, ear pain, hoarse voice and trouble swallowing.    Respiratory:  Positive for cough. Negative for shortness of breath and stridor.    Gastrointestinal:  Negative for abdominal pain, diarrhea and vomiting.   Musculoskeletal:  Negative for neck pain.   Neurological:  Negative for headaches.   All other systems reviewed and are negative.    Objective   /84   Ht 1.778 m (5' 10\")   Wt 126 kg (278 lb)   BMI 39.89 kg/m²     Physical Exam  Vitals reviewed.   HENT:      Nose: Congestion present.      Comments: Postnasal drip.     Mouth/Throat:      Comments: Throat congested.  Cardiovascular:      Heart sounds: Normal heart sounds, S1 normal and S2 normal. No murmur heard.     No friction rub.   Pulmonary:      Effort: Pulmonary effort is normal.      " Breath sounds: Wheezing present.   Abdominal:      Palpations: There is no hepatomegaly, splenomegaly or mass.   Musculoskeletal:      Right lower leg: No edema.      Left lower leg: No edema.   Lymphadenopathy:      Lower Body: No right inguinal adenopathy. No left inguinal adenopathy.   Neurological:      Cranial Nerves: Cranial nerves 2-12 are intact.      Sensory: No sensory deficit.      Motor: Motor function is intact.      Deep Tendon Reflexes: Reflexes are normal and symmetric.     LAB WORK: Laboratory testing discussed.    Assessment/Plan   Problem List Items Addressed This Visit             ICD-10-CM       Allergies and Adverse Reactions    Allergy T78.40XA       Cardiac and Vasculature    Hypertension I10     Other Visit Diagnoses         Codes    Bronchitis    -  Primary J40    Relevant Medications    albuterol (Ventolin HFA) 90 mcg/actuation inhaler    loratadine (Claritin) 10 mg tablet    levoFLOXacin (Levaquin) 500 mg tablet    benzonatate (Tessalon) 100 mg capsule    Other Relevant Orders    Comprehensive Metabolic Panel    CBC and Auto Differential    Lipid Panel    Hemoglobin A1C    Thyroid Stimulating Hormone    XR chest 2 views        1. Acute asthmatic bronchitis and cough.  Chest x-ray ordered.  Levaquin, Claritin, Robitussin, albuterol.  Follow-up in two weeks if not better.  2. Hypertension, okay.  3. Allergies, stable.  4. Routine blood work ordered.  5. I shall see him in a week after tests if necessary.    Scribe Attestation  By signing my name below, IAutumn Scribe attest that this documentation has been prepared under the direction and in the presence of Areli Calvo MD.       All medical record entries made by the scribe were personally dictated by me I have reviewed the chart and agree the record accurately reflects my personal performance of his history physical examination and management

## 2025-03-23 LAB
ALBUMIN SERPL-MCNC: 4.5 G/DL (ref 3.6–5.1)
ALP SERPL-CCNC: 56 U/L (ref 36–130)
ALT SERPL-CCNC: 24 U/L (ref 9–46)
ANION GAP SERPL CALCULATED.4IONS-SCNC: 8 MMOL/L (CALC) (ref 7–17)
AST SERPL-CCNC: 24 U/L (ref 10–40)
BASOPHILS # BLD AUTO: 43 CELLS/UL (ref 0–200)
BASOPHILS NFR BLD AUTO: 0.7 %
BILIRUB SERPL-MCNC: 0.5 MG/DL (ref 0.2–1.2)
BUN SERPL-MCNC: 18 MG/DL (ref 7–25)
CALCIUM SERPL-MCNC: 9.1 MG/DL (ref 8.6–10.3)
CHLORIDE SERPL-SCNC: 104 MMOL/L (ref 98–110)
CHOLEST SERPL-MCNC: 148 MG/DL
CHOLEST/HDLC SERPL: 4.5 (CALC)
CO2 SERPL-SCNC: 27 MMOL/L (ref 20–32)
CREAT SERPL-MCNC: 0.76 MG/DL (ref 0.6–1.26)
EGFRCR SERPLBLD CKD-EPI 2021: 119 ML/MIN/1.73M2
EOSINOPHIL # BLD AUTO: 238 CELLS/UL (ref 15–500)
EOSINOPHIL NFR BLD AUTO: 3.9 %
ERYTHROCYTE [DISTWIDTH] IN BLOOD BY AUTOMATED COUNT: 12.7 % (ref 11–15)
EST. AVERAGE GLUCOSE BLD GHB EST-MCNC: 108 MG/DL
EST. AVERAGE GLUCOSE BLD GHB EST-SCNC: 6 MMOL/L
GLUCOSE SERPL-MCNC: 104 MG/DL (ref 65–99)
HBA1C MFR BLD: 5.4 % OF TOTAL HGB
HCT VFR BLD AUTO: 45 % (ref 38.5–50)
HDLC SERPL-MCNC: 33 MG/DL
HGB BLD-MCNC: 14.6 G/DL (ref 13.2–17.1)
LDLC SERPL CALC-MCNC: 102 MG/DL (CALC)
LYMPHOCYTES # BLD AUTO: 1629 CELLS/UL (ref 850–3900)
LYMPHOCYTES NFR BLD AUTO: 26.7 %
MCH RBC QN AUTO: 29.6 PG (ref 27–33)
MCHC RBC AUTO-ENTMCNC: 32.4 G/DL (ref 32–36)
MCV RBC AUTO: 91.1 FL (ref 80–100)
MONOCYTES # BLD AUTO: 549 CELLS/UL (ref 200–950)
MONOCYTES NFR BLD AUTO: 9 %
NEUTROPHILS # BLD AUTO: 3642 CELLS/UL (ref 1500–7800)
NEUTROPHILS NFR BLD AUTO: 59.7 %
NONHDLC SERPL-MCNC: 115 MG/DL (CALC)
PLATELET # BLD AUTO: 254 THOUSAND/UL (ref 140–400)
PMV BLD REES-ECKER: 11.8 FL (ref 7.5–12.5)
POTASSIUM SERPL-SCNC: 4.4 MMOL/L (ref 3.5–5.3)
PROT SERPL-MCNC: 7 G/DL (ref 6.1–8.1)
RBC # BLD AUTO: 4.94 MILLION/UL (ref 4.2–5.8)
SODIUM SERPL-SCNC: 139 MMOL/L (ref 135–146)
TRIGL SERPL-MCNC: 45 MG/DL
TSH SERPL-ACNC: 0.72 MIU/L (ref 0.4–4.5)
WBC # BLD AUTO: 6.1 THOUSAND/UL (ref 3.8–10.8)

## 2025-04-29 ENCOUNTER — APPOINTMENT (OUTPATIENT)
Dept: PRIMARY CARE | Facility: CLINIC | Age: 40
End: 2025-04-29
Payer: COMMERCIAL